# Patient Record
Sex: MALE | Race: WHITE | Employment: UNEMPLOYED | URBAN - METROPOLITAN AREA
[De-identification: names, ages, dates, MRNs, and addresses within clinical notes are randomized per-mention and may not be internally consistent; named-entity substitution may affect disease eponyms.]

---

## 2020-11-16 ENCOUNTER — APPOINTMENT (OUTPATIENT)
Dept: GENERAL RADIOLOGY | Age: 35
End: 2020-11-16

## 2020-11-16 ENCOUNTER — HOSPITAL ENCOUNTER (EMERGENCY)
Age: 35
Discharge: HOME | End: 2020-11-16

## 2020-11-16 PROCEDURE — 6360000002 HC RX W HCPCS: Performed by: PHYSICIAN ASSISTANT

## 2020-11-16 PROCEDURE — 96372 THER/PROPH/DIAG INJ SC/IM: CPT

## 2020-11-16 PROCEDURE — 99282 EMERGENCY DEPT VISIT SF MDM: CPT

## 2020-11-16 PROCEDURE — 73140 X-RAY EXAM OF FINGER(S): CPT

## 2020-11-16 PROCEDURE — 12002 RPR S/N/AX/GEN/TRNK2.6-7.5CM: CPT

## 2020-11-16 RX ORDER — HYDROCODONE BITARTRATE AND ACETAMINOPHEN 5; 325 MG/1; MG/1
1 TABLET ORAL EVERY 6 HOURS PRN
Qty: 10 TABLET | Refills: 0 | Status: SHIPPED | OUTPATIENT
Start: 2020-11-16 | End: 2020-11-19

## 2020-11-16 RX ORDER — CEFAZOLIN SODIUM 1 G/3ML
1 INJECTION, POWDER, FOR SOLUTION INTRAMUSCULAR; INTRAVENOUS ONCE
Status: COMPLETED | OUTPATIENT
Start: 2020-11-16 | End: 2020-11-16

## 2020-11-16 RX ORDER — CEPHALEXIN 500 MG/1
500 CAPSULE ORAL 4 TIMES DAILY
Qty: 40 CAPSULE | Refills: 0 | Status: SHIPPED | OUTPATIENT
Start: 2020-11-16 | End: 2020-11-26

## 2020-11-16 RX ADMIN — CEFAZOLIN SODIUM 1 G: 1 INJECTION, POWDER, FOR SOLUTION INTRAMUSCULAR; INTRAVENOUS at 20:05

## 2020-11-17 ENCOUNTER — OFFICE VISIT (OUTPATIENT)
Dept: ORTHOPEDIC SURGERY | Age: 35
End: 2020-11-17

## 2020-11-17 VITALS — HEIGHT: 74 IN | WEIGHT: 185 LBS | TEMPERATURE: 98.3 F | BODY MASS INDEX: 23.74 KG/M2

## 2020-11-17 PROBLEM — S62.661B: Status: ACTIVE | Noted: 2020-11-17

## 2020-11-17 PROCEDURE — 99203 OFFICE O/P NEW LOW 30 MIN: CPT | Performed by: ORTHOPAEDIC SURGERY

## 2020-11-17 PROCEDURE — 26750 TREAT FINGER FRACTURE EACH: CPT | Performed by: ORTHOPAEDIC SURGERY

## 2020-11-17 NOTE — PROGRESS NOTES
CHIEF COMPLAINT: Left hand pain/ index finger distal phalanx open fracture. DATE OF INJURY: 11/16/2020    HISTORY:  Mr. Shannen Ham 38 y.o.  male right handed presents today for the first visit for evaluation of a left hand injury which occurred when he slipped and cut his finger on a table saw. He is complaining of index finger pain and swelling. Rating 2/10 VAS. This is better with elevation and worse with ROM. The pain is sharp and not radiating. No other complaint. He was seen at [facility] ER, was evaluated, sutured and splinted and asked to see orthopedics. He was started on antibiotics, Keflex but he has not started those. He works as a  and plays a guitar. He denies smoking. Past Medical History:   Diagnosis Date    Dermatitis        Past Surgical History:   Procedure Laterality Date    WISDOM TOOTH EXTRACTION         Social History     Socioeconomic History    Marital status:      Spouse name: Not on file    Number of children: Not on file    Years of education: Not on file    Highest education level: Not on file   Occupational History    Not on file   Social Needs    Financial resource strain: Not on file    Food insecurity     Worry: Not on file     Inability: Not on file    Transportation needs     Medical: Not on file     Non-medical: Not on file   Tobacco Use    Smoking status: Never Smoker    Smokeless tobacco: Never Used   Substance and Sexual Activity    Alcohol use:  Yes    Drug use: Not on file    Sexual activity: Not on file   Lifestyle    Physical activity     Days per week: Not on file     Minutes per session: Not on file    Stress: Not on file   Relationships    Social connections     Talks on phone: Not on file     Gets together: Not on file     Attends [employer] service: Not on file     Active member of club or organization: Not on file     Attends meetings of clubs or organizations: Not on file     Relationship status: Not on file    Intimate partner violence     Fear of current or ex partner: Not on file     Emotionally abused: Not on file     Physically abused: Not on file     Forced sexual activity: Not on file   Other Topics Concern    Not on file   Social History Narrative    Not on file       No family history on file. Current Outpatient Medications on File Prior to Visit   Medication Sig Dispense Refill    cephALEXin (KEFLEX) 500 MG capsule Take 1 capsule by mouth 4 times daily for 10 days 40 capsule 0    HYDROcodone-acetaminophen (NORCO) 5-325 MG per tablet Take 1 tablet by mouth every 6 hours as needed for Pain for up to 3 days. 10 tablet 0     No current facility-administered medications on file prior to visit. Pertinent items are noted in HPI  Review of systems reviewed from Patient History Form dated on 11/17/2020 and available in the patient's chart under the Media tab. No change noted. PHYSICAL EXAMINATION:  Mr. Bladimir Velasquez is a very pleasant 38 y.o.  male who presents today in no acute distress, awake, alert, and oriented. He is well dressed, nourished and  groomed. Patient with normal affect. Height is  6' 2\" (1.88 m), weight is 185 lb (83.9 kg), Body mass index is 23.75 kg/m². Resting respiratory rate is 16. Examination of the gait, showed that the patient walks with no limp . Examination of both upper extremities showing a decreased range of motion of the left index finger compare to the other side. There is moderate swelling that can be seen, as well as ecchymosis of the index finger. There are sutures in for the distal phalanx and the nail. He has intact sensation and good radial pulses. He has significant tenderness on deep palpation over the distal phalanx of the index finger left hand. There is no rotational deformity of the left index finger.                IMAGING: Trisha Taylor were reviewed, dated 11/16/2020 from [facility] ER,  3 views of the left hand, and showed a index finger distal phalanx minimally displaced comminuted fracture. IMPRESSION: Left hand index finger distal phalanx minimally displaced open fracture. PLAN:  I discussed that the overall alignment of this fracture is good and that we can try to treat this non-operatively in a dressing, which was applied today and instructed in care. Warm soaks daily. We discussed the risk of nonunion and or malunion. He was instructed to work on range of motion. We will see him  back in 1 weeks for wound check. We will likely keep the sutures in place for 2 to 3 weeks.       Axel Tavera MD

## 2020-11-17 NOTE — ED PROVIDER NOTES
PAST MEDICAL HISTORY   No past medical history on file. SURGICAL HISTORY     Past Surgical History:   Procedure Laterality Date    FINGER SURGERY Right 12/30/15    incision and drainage righthand with nailbed repair, pining of index finger    FINGER SURGERY Right 1/28/16    MOUTH SURGERY           CURRENTMEDICATIONS       Previous Medications    No medications on file         ALLERGIES     Patient has no known allergies. FAMILYHISTORY       Family History   Problem Relation Age of Onset    Cancer Maternal Grandmother     Heart Disease Maternal Grandmother     Diabetes Maternal Grandfather     Cancer Maternal Grandfather     Heart Disease Maternal Grandfather     Cancer Paternal Grandmother     Cancer Paternal Grandfather     Anesth Problems Neg Hx     Broken Bones Neg Hx     Clotting Disorder Neg Hx     Collagen Disease Neg Hx     Dislocations Neg Hx     Osteoporosis Neg Hx     Rheumatologic Disease Neg Hx     Scoliosis Neg Hx     Severe Sprains Neg Hx           SOCIAL HISTORY       Social History     Tobacco Use    Smoking status: Never Smoker    Smokeless tobacco: Current User     Types: Chew   Substance Use Topics    Alcohol use: Yes     Comment: social     Drug use: No       SCREENINGS             PHYSICAL EXAM    (up to 7 for level 4, 8 or more for level 5)     ED Triage Vitals   BP Temp Temp src Pulse Resp SpO2 Height Weight   -- -- -- -- -- -- -- --       Physical Exam  Vitals signs and nursing note reviewed. Constitutional:       Appearance: He is well-developed. He is not diaphoretic. HENT:      Head: Normocephalic and atraumatic. Right Ear: External ear normal.      Left Ear: External ear normal.      Nose: Nose normal.   Eyes:      General:         Right eye: No discharge. Left eye: No discharge. Neck:      Musculoskeletal: Normal range of motion and neck supple. Cardiovascular:      Pulses: Normal pulses.    Pulmonary:      Effort: Pulmonary effort is normal. No respiratory distress. Musculoskeletal: Normal range of motion. Left hand: He exhibits laceration. Hands:    Skin:     General: Skin is warm and dry. Neurological:      Mental Status: He is alert and oriented to person, place, and time. Sensory: Sensation is intact. Motor: Motor function is intact. Psychiatric:         Behavior: Behavior normal.                 DIAGNOSTIC RESULTS   LABS:    Labs Reviewed - No data to display    All other labs were within normal range or not returned as of this dictation. EKG: All EKG's are interpreted by the Emergency Department Physician in the absence of a cardiologist.  Please see their note for interpretation of EKG. RADIOLOGY:   Non-plain film images such as CT, Ultrasound and MRI are read by the radiologist. Plain radiographic images are visualized and preliminarily interpreted by the ED Provider with the below findings:        Interpretation per the Radiologist below, if available at the time of this note:    XR FINGER RIGHT (MIN 2 VIEWS)    (Results Pending)     No results found. PROCEDURES   Unless otherwise noted below, none     Procedures  Laceration Repair Procedure Note    Indication: Laceration    Procedure: The patient was placed in the appropriate position and anesthesia around the laceration was obtained with a full digital block of the left index finger using 0.5% Bupivacaine without epinephrine. The area was then irrigated with high pressure Shur-Clens and normal saline solution and explored with no foreign bodies discovered and no tendon injury noted. The laceration was closed with 5-0 Prolene using interrupted sutures. There were no additional lacerations requiring repair. The wound area was then dressed with bacitracin, gauze, vaseline soaked gauze and Coban. Total repaired wound length: 3 cm. Other Items: Suture count: 8    The patient tolerated the procedure well.     Complications: MEDICATIONS:  New Prescriptions    No medications on file       DISCONTINUED MEDICATIONS:  Discontinued Medications    No medications on file              (Please note that portions of this note were completed with a voice recognition program.  Efforts were made to edit the dictations but occasionally words are mis-transcribed.)    Tasneem Schwab PA-C (electronically signed)           Roberta Alves PA-C  11/16/20 2028

## 2020-11-17 NOTE — ED NOTES
Patient discharged at this time in no acute distress after verbalizing understanding of discharge instructions and need for follow up with PCP .   Pt left ambulatory to discharge area after reviewing and receiving copy of ov AVS.   Patient education  Learner- Patient and family  Motivation and readiness to learn- Medium to High  Barriers to learning- None  Learning preference/provided instructions- Both written and verbal discharge instructions     Xenia Mcdaniels RN  11/16/20 1971

## 2020-11-24 ENCOUNTER — OFFICE VISIT (OUTPATIENT)
Dept: ORTHOPEDIC SURGERY | Age: 35
End: 2020-11-24

## 2020-11-24 VITALS — BODY MASS INDEX: 23.74 KG/M2 | TEMPERATURE: 96.6 F | HEIGHT: 74 IN | WEIGHT: 185 LBS

## 2020-11-24 PROCEDURE — 99213 OFFICE O/P EST LOW 20 MIN: CPT | Performed by: ORTHOPAEDIC SURGERY

## 2020-11-24 NOTE — PROGRESS NOTES
CHIEF COMPLAINT: Left hand pain/ index finger distal phalanx open fracture. DATE OF INJURY: 11/16/2020    HISTORY:  Mr. Ivory Parko 38 y.o.  male right handed presents today for f/u evaluation of a left hand injury which occurred when he slipped and cut his finger on a table saw. He is complaining of index finger pain and swelling. Rating 3/10 VAS. This is better with elevation and worse with ROM. The pain is sharp and not radiating. No other complaint. He was seen at [facility] ER, was evaluated, sutured and splinted and asked to see orthopedics. He was started on antibiotics, Keflex. He works as a  and plays a guitar. He denies smoking. Past Medical History:   Diagnosis Date    Dermatitis        Past Surgical History:   Procedure Laterality Date    WISDOM TOOTH EXTRACTION         Social History     Socioeconomic History    Marital status:      Spouse name: Not on file    Number of children: Not on file    Years of education: Not on file    Highest education level: Not on file   Occupational History    Not on file   Social Needs    Financial resource strain: Not on file    Food insecurity     Worry: Not on file     Inability: Not on file    Transportation needs     Medical: Not on file     Non-medical: Not on file   Tobacco Use    Smoking status: Never Smoker    Smokeless tobacco: Never Used   Substance and Sexual Activity    Alcohol use:  Yes    Drug use: Not on file    Sexual activity: Not on file   Lifestyle    Physical activity     Days per week: Not on file     Minutes per session: Not on file    Stress: Not on file   Relationships    Social connections     Talks on phone: Not on file     Gets together: Not on file     Attends [employer] service: Not on file     Active member of club or organization: Not on file     Attends meetings of clubs or organizations: Not on file     Relationship status: Not on file    Intimate partner violence     Fear of current or ex partner: Not on file     Emotionally abused: Not on file     Physically abused: Not on file     Forced sexual activity: Not on file   Other Topics Concern    Not on file   Social History Narrative    Not on file       History reviewed. No pertinent family history. Current Outpatient Medications on File Prior to Visit   Medication Sig Dispense Refill    cephALEXin (KEFLEX) 500 MG capsule Take 1 capsule by mouth 4 times daily for 10 days 40 capsule 0     No current facility-administered medications on file prior to visit. Pertinent items are noted in HPI  Review of systems reviewed from Patient History Form dated on 11/17/2020 and available in the patient's chart under the Media tab. No change noted. PHYSICAL EXAMINATION:  Mr. Ketty Denise is a very pleasant 38 y.o.  male who presents today in no acute distress, awake, alert, and oriented. He is well dressed, nourished and  groomed. Patient with normal affect. Height is  6' 2\" (1.88 m), weight is 185 lb (83.9 kg), Body mass index is 23.75 kg/m². Resting respiratory rate is 16. Examination of the gait, showed that the patient walks with no limp . Examination of both upper extremities showing a decreased range of motion of the left index finger compare to the other side. There is moderate swelling that can be seen, as well as ecchymosis of the index finger. There are sutures in for the distal phalanx and the nail. He has intact sensation and good radial pulses. He has significant tenderness on deep palpation over the distal phalanx of the index finger left hand. There is no rotational deformity of the left index finger. IMAGING: Allan Navarro were reviewed, dated 11/16/2020 from [facility] ER,  3 views of the left hand, and showed a index finger distal phalanx minimally displaced comminuted fracture. IMPRESSION: Left hand index finger distal phalanx minimally displaced open fracture.     PLAN:  I discussed that the overall alignment of this fracture is good and that we can try to treat this non-operatively in a dressing, which was applied today and instructed in care, continue PO ABX. Warm soaks daily. We discussed the risk of nonunion and or malunion. He was instructed to work on range of motion.   We will see him  back in 1 weeks for wound check and sutures iremoval.      Petey Grimes MD

## 2020-12-01 ENCOUNTER — OFFICE VISIT (OUTPATIENT)
Dept: ORTHOPEDIC SURGERY | Age: 35
End: 2020-12-01

## 2020-12-01 VITALS — WEIGHT: 185 LBS | BODY MASS INDEX: 23.74 KG/M2 | TEMPERATURE: 97.8 F | HEIGHT: 74 IN

## 2020-12-01 PROCEDURE — 99024 POSTOP FOLLOW-UP VISIT: CPT | Performed by: ORTHOPAEDIC SURGERY

## 2020-12-01 NOTE — PROGRESS NOTES
CHIEF COMPLAINT: Left hand pain/ index finger distal phalanx open fracture. DATE OF INJURY: 11/16/2020    HISTORY:  Mr. Ivory Parko 38 y.o.  male right handed presents today for f/u evaluation of a left hand injury which occurred when he slipped and cut his finger on a table saw. He is complaining of index finger pain and swelling. Rates pain a  0/10 VAS and is much better. The pain is mildly tender and not radiating. No other complaint. He was seen at [facility] ER, was evaluated, sutured and splinted and asked to see orthopedics. He has completed antibiotics, Keflex. He works as a  and plays a guitar. He denies smoking. Past Medical History:   Diagnosis Date    Dermatitis        Past Surgical History:   Procedure Laterality Date    WISDOM TOOTH EXTRACTION         Social History     Socioeconomic History    Marital status:      Spouse name: Not on file    Number of children: Not on file    Years of education: Not on file    Highest education level: Not on file   Occupational History    Not on file   Social Needs    Financial resource strain: Not on file    Food insecurity     Worry: Not on file     Inability: Not on file    Transportation needs     Medical: Not on file     Non-medical: Not on file   Tobacco Use    Smoking status: Never Smoker    Smokeless tobacco: Never Used   Substance and Sexual Activity    Alcohol use:  Yes    Drug use: Not on file    Sexual activity: Not on file   Lifestyle    Physical activity     Days per week: Not on file     Minutes per session: Not on file    Stress: Not on file   Relationships    Social connections     Talks on phone: Not on file     Gets together: Not on file     Attends [employer] service: Not on file     Active member of club or organization: Not on file     Attends meetings of clubs or organizations: Not on file     Relationship status: Not on file    Intimate partner violence     Fear of current or ex partner: Not on file Emotionally abused: Not on file     Physically abused: Not on file     Forced sexual activity: Not on file   Other Topics Concern    Not on file   Social History Narrative    Not on file       History reviewed. No pertinent family history. No current outpatient medications on file prior to visit. No current facility-administered medications on file prior to visit. Pertinent items are noted in HPI  Review of systems reviewed from Patient History Form dated on 11/17/2020 and available in the patient's chart under the Media tab. No change noted. PHYSICAL EXAMINATION:  Mr. Denisse Terry is a very pleasant 38 y.o.  male who presents today in no acute distress, awake, alert, and oriented. He is well dressed, nourished and  groomed. Patient with normal affect. Height is  6' 2\" (1.88 m), weight is 185 lb (83.9 kg), Body mass index is 23.75 kg/m². Resting respiratory rate is 16. Examination of the gait, showed that the patient walks with no limp . Examination of both upper extremities showing a decreased range of motion of the left index finger compare to the other side in the DIP joint. There is mild swelling that can be seen, as well as ecchymosis of the index finger. There are sutures in for the distal phalanx and the nail. He has intact sensation and good radial pulses. He has mild to no tenderness on deep palpation over the distal phalanx of the index finger left hand. There is no rotational deformity of the left index finger. IMAGING: Elaminaia Plana were reviewed, dated 11/16/2020 from [facility] ER,  3 views of the left hand, and showed a index finger distal phalanx minimally displaced comminuted fracture. IMPRESSION: Left hand index finger distal phalanx minimally displaced open fracture. PLAN:  I discussed that the overall alignment of this fracture is good and that we can try to treat this non-operatively in a dressing, which was applied today.   Sutures removed today and tolerated well. We discussed the risk of nonunion and or malunion. He was instructed to work on range of motion. We will see him  back in 2 weeks for wound check and will repeat x-rays at that time.       Charis Rose MD

## 2021-01-05 ENCOUNTER — OFFICE VISIT (OUTPATIENT)
Dept: ORTHOPEDIC SURGERY | Age: 36
End: 2021-01-05

## 2021-01-05 VITALS — BODY MASS INDEX: 23.75 KG/M2 | WEIGHT: 185 LBS | TEMPERATURE: 96.6 F

## 2021-01-05 DIAGNOSIS — S62.661B NONDISPLACED FRACTURE OF DISTAL PHALANX OF LEFT INDEX FINGER, INITIAL ENCOUNTER FOR OPEN FRACTURE: Primary | ICD-10-CM

## 2021-01-05 PROCEDURE — 99024 POSTOP FOLLOW-UP VISIT: CPT | Performed by: ORTHOPAEDIC SURGERY

## 2021-01-05 NOTE — PROGRESS NOTES
CHIEF COMPLAINT: Left hand pain/ index finger distal phalanx open fracture. DATE OF INJURY: 11/16/2020    HISTORY:  Mr. Danay Orr 38 y.o.  male right handed presents today for f/u evaluation of a left hand injury which occurred when he slipped and cut his finger on a table saw. He is complaining of index finger pain and swelling. Rates pain a  0/10 VAS and is much better. The pain is mildly tender and not radiating. No other complaint. He was seen at [facility] ER, was evaluated, sutured and splinted and asked to see orthopedics. He has completed antibiotics, Keflex. He works as a  and plays a guitar. He denies smoking. Past Medical History:   Diagnosis Date    Dermatitis        Past Surgical History:   Procedure Laterality Date    WISDOM TOOTH EXTRACTION         Social History     Socioeconomic History    Marital status:      Spouse name: Not on file    Number of children: Not on file    Years of education: Not on file    Highest education level: Not on file   Occupational History    Not on file   Social Needs    Financial resource strain: Not on file    Food insecurity     Worry: Not on file     Inability: Not on file    Transportation needs     Medical: Not on file     Non-medical: Not on file   Tobacco Use    Smoking status: Never Smoker    Smokeless tobacco: Never Used   Substance and Sexual Activity    Alcohol use:  Yes    Drug use: Not on file    Sexual activity: Not on file   Lifestyle    Physical activity     Days per week: Not on file     Minutes per session: Not on file    Stress: Not on file   Relationships    Social connections     Talks on phone: Not on file     Gets together: Not on file     Attends [employer] service: Not on file     Active member of club or organization: Not on file     Attends meetings of clubs or organizations: Not on file     Relationship status: Not on file    Intimate partner violence     Fear of current or ex partner: Not on file Emotionally abused: Not on file     Physically abused: Not on file     Forced sexual activity: Not on file   Other Topics Concern    Not on file   Social History Narrative    Not on file       History reviewed. No pertinent family history. No current outpatient medications on file prior to visit. No current facility-administered medications on file prior to visit. Pertinent items are noted in HPI  Review of systems reviewed from Patient History Form dated on 11/17/2020 and available in the patient's chart under the Media tab. No change noted. PHYSICAL EXAMINATION:  Mr. Susy Jaramillo is a very pleasant 38 y.o.  male who presents today in no acute distress, awake, alert, and oriented. He is well dressed, nourished and  groomed. Patient with normal affect. Height is   , weight is 185 lb (83.9 kg), Body mass index is 23.75 kg/m². Resting respiratory rate is 16. Examination of the gait, showed that the patient walks with no limp . Examination of both upper extremities showing a decreased range of motion of the left index finger compare to the other side in the DIP joint. There is mild swelling that can be seen, as well as ecchymosis of the index finger. There are sutures in for the distal phalanx and the nail. He has intact sensation and good radial pulses. He has mild to no tenderness on deep palpation over the distal phalanx of the index finger left hand. There is no rotational deformity of the left index finger, but there is a nail deformity. IMAGING: Esther Daryl were reviewed, taken today in the office, 3 views of the left hand, and showed a index finger distal phalanx minimally displaced comminuted fracture. IMPRESSION: Left hand index finger distal phalanx minimally displaced open fracture. PLAN:  I discussed that the overall alignment of this fracture is good and that we can try to treat this non-operatively in a dressing, which was applied today.   Sutures removed today and tolerated well. We discussed the risk of nonunion and or malunion. He was instructed to work on range of motion. We will see him  back in 6 weeks for wound check and will repeat x-rays at that time.       Geo Gray MD

## 2021-02-16 ENCOUNTER — OFFICE VISIT (OUTPATIENT)
Dept: ORTHOPEDIC SURGERY | Age: 36
End: 2021-02-16

## 2021-02-16 VITALS
HEIGHT: 74 IN | TEMPERATURE: 96.8 F | DIASTOLIC BLOOD PRESSURE: 74 MMHG | SYSTOLIC BLOOD PRESSURE: 115 MMHG | HEART RATE: 59 BPM | BODY MASS INDEX: 23.74 KG/M2 | WEIGHT: 185 LBS

## 2021-02-16 DIAGNOSIS — S62.661B NONDISPLACED FRACTURE OF DISTAL PHALANX OF LEFT INDEX FINGER, INITIAL ENCOUNTER FOR OPEN FRACTURE: Primary | ICD-10-CM

## 2021-02-16 PROCEDURE — 99213 OFFICE O/P EST LOW 20 MIN: CPT | Performed by: NURSE PRACTITIONER

## 2021-02-16 NOTE — PROGRESS NOTES
file     Relationship status: Not on file    Intimate partner violence     Fear of current or ex partner: Not on file     Emotionally abused: Not on file     Physically abused: Not on file     Forced sexual activity: Not on file   Other Topics Concern    Not on file   Social History Narrative    Not on file     No current outpatient medications on file. No current facility-administered medications for this visit. Pertinent items are noted in HPI  Review of systems reviewed from Patient History Form dated on 11/16/2020 and available in the patient's chart under the Media tab. No change noted. PHYSICAL EXAMINATION:  Mr. Malik Reich is a very pleasant 39 y.o.  male who presents today in no acute distress, awake, alert, and oriented. He is well dressed, nourished and  groomed. Patient with normal affect. Height is  6' 2\" (1.88 m), weight is 185 lb (83.9 kg), Body mass index is 23.75 kg/m². Resting respiratory rate is 16. Examination of the gait, showed that the patient walks with no limp . Examination of both upper extremities showing a full range of motion of the left index finger compare to the other side. There is mild to no swelling that can be seen, no ecchymosis of the index finger. .He has intact sensation and good radial pulses. He has no tenderness on deep palpation over the distal phalanx of the index finger left hand. There is no rotational deformity of the left index finger, but there is a nail deformity. The new nail has a deformity, but is growing in. Good strength, and no instability both upper and lower extremities. IMAGING: Ana Laura Montez were reviewed, taken today in the office, 3 views of the left hand, and showed a index finger distal phalanx minimally displaced comminuted fracture. IMPRESSION: Left hand index finger distal phalanx minimally displaced open fracture. PLAN:  I discussed that the overall alignment of this fracture is good.   He was instructed to work on range of motion. He can go back to normal activity with no restrictions. He will be seen in 3 months  PRN. I told the patient that it is not unusual to have some achy pain and swelling for up to a year after a fracture.       Noemy Moscoso, APRN - CNP

## 2023-03-13 ENCOUNTER — HOSPITAL ENCOUNTER (EMERGENCY)
Age: 38
Discharge: HOME | End: 2023-03-13
Attending: EMERGENCY MEDICINE

## 2023-03-13 ENCOUNTER — APPOINTMENT (OUTPATIENT)
Dept: CT IMAGING | Age: 38
End: 2023-03-13

## 2023-03-13 VITALS
TEMPERATURE: 97.7 F | RESPIRATION RATE: 18 BRPM | WEIGHT: 175 LBS | HEIGHT: 74 IN | BODY MASS INDEX: 22.46 KG/M2 | SYSTOLIC BLOOD PRESSURE: 125 MMHG | OXYGEN SATURATION: 98 % | HEART RATE: 69 BPM | DIASTOLIC BLOOD PRESSURE: 71 MMHG

## 2023-03-13 DIAGNOSIS — N20.0 KIDNEY STONE: Primary | ICD-10-CM

## 2023-03-13 DIAGNOSIS — R10.9 RIGHT FLANK PAIN: ICD-10-CM

## 2023-03-13 LAB
A/G RATIO: 1.7 (ref 1.1–2.2)
ALBUMIN SERPL-MCNC: 4.6 G/DL (ref 3.4–5)
ALP BLD-CCNC: 96 U/L (ref 40–129)
ALT SERPL-CCNC: 7 U/L (ref 10–40)
AMPHETAMINE SCREEN, URINE: NORMAL
ANION GAP SERPL CALCULATED.3IONS-SCNC: 15 MMOL/L (ref 3–16)
AST SERPL-CCNC: 19 U/L (ref 15–37)
BACTERIA: ABNORMAL /HPF
BARBITURATE SCREEN URINE: NORMAL
BASOPHILS ABSOLUTE: 0 K/UL (ref 0–0.2)
BASOPHILS RELATIVE PERCENT: 0.6 %
BENZODIAZEPINE SCREEN, URINE: NORMAL
BILIRUB SERPL-MCNC: 0.4 MG/DL (ref 0–1)
BILIRUBIN URINE: NEGATIVE
BLOOD, URINE: ABNORMAL
BUN BLDV-MCNC: 18 MG/DL (ref 7–20)
CALCIUM SERPL-MCNC: 9.7 MG/DL (ref 8.3–10.6)
CANNABINOID SCREEN URINE: NORMAL
CHLORIDE BLD-SCNC: 100 MMOL/L (ref 99–110)
CLARITY: CLEAR
CO2: 26 MMOL/L (ref 21–32)
COCAINE METABOLITE SCREEN URINE: NORMAL
COLOR: YELLOW
CREAT SERPL-MCNC: 1.1 MG/DL (ref 0.9–1.3)
EOSINOPHILS ABSOLUTE: 0.3 K/UL (ref 0–0.6)
EOSINOPHILS RELATIVE PERCENT: 4.7 %
EPITHELIAL CELLS, UA: 0 /HPF (ref 0–5)
ETHANOL: NORMAL MG/DL (ref 0–0.08)
FENTANYL SCREEN, URINE: NORMAL
GFR SERPL CREATININE-BSD FRML MDRD: >60 ML/MIN/{1.73_M2}
GLUCOSE BLD-MCNC: 149 MG/DL (ref 70–99)
GLUCOSE URINE: NEGATIVE MG/DL
HCT VFR BLD CALC: 45.8 % (ref 40.5–52.5)
HEMOGLOBIN: 14.9 G/DL (ref 13.5–17.5)
HYALINE CASTS: 0 /LPF (ref 0–8)
KETONES, URINE: 40 MG/DL
LACTIC ACID, SEPSIS: 2.1 MMOL/L (ref 0.4–1.9)
LACTIC ACID, SEPSIS: 5.7 MMOL/L (ref 0.4–1.9)
LEUKOCYTE ESTERASE, URINE: NEGATIVE
LIPASE: 42 U/L (ref 13–60)
LYMPHOCYTES ABSOLUTE: 1.9 K/UL (ref 1–5.1)
LYMPHOCYTES RELATIVE PERCENT: 30 %
Lab: NORMAL
MCH RBC QN AUTO: 28.5 PG (ref 26–34)
MCHC RBC AUTO-ENTMCNC: 32.4 G/DL (ref 31–36)
MCV RBC AUTO: 87.9 FL (ref 80–100)
METHADONE SCREEN, URINE: NORMAL
MICROSCOPIC EXAMINATION: YES
MONOCYTES ABSOLUTE: 0.3 K/UL (ref 0–1.3)
MONOCYTES RELATIVE PERCENT: 4.5 %
NEUTROPHILS ABSOLUTE: 3.9 K/UL (ref 1.7–7.7)
NEUTROPHILS RELATIVE PERCENT: 60.2 %
NITRITE, URINE: NEGATIVE
OPIATE SCREEN URINE: NORMAL
OXYCODONE URINE: NORMAL
PDW BLD-RTO: 13.7 % (ref 12.4–15.4)
PH UA: 7
PH UA: 7 (ref 5–8)
PHENCYCLIDINE SCREEN URINE: NORMAL
PLATELET # BLD: 285 K/UL (ref 135–450)
PMV BLD AUTO: 7.6 FL (ref 5–10.5)
POTASSIUM REFLEX MAGNESIUM: 3.8 MMOL/L (ref 3.5–5.1)
PROTEIN UA: 30 MG/DL
RBC # BLD: 5.22 M/UL (ref 4.2–5.9)
RBC UA: 25 /HPF (ref 0–4)
SODIUM BLD-SCNC: 141 MMOL/L (ref 136–145)
SPECIFIC GRAVITY UA: 1.02 (ref 1–1.03)
TOTAL PROTEIN: 7.3 G/DL (ref 6.4–8.2)
URINE REFLEX TO CULTURE: ABNORMAL
URINE TYPE: ABNORMAL
UROBILINOGEN, URINE: 1 E.U./DL
WBC # BLD: 6.5 K/UL (ref 4–11)
WBC UA: 0 /HPF (ref 0–5)

## 2023-03-13 PROCEDURE — 80307 DRUG TEST PRSMV CHEM ANLYZR: CPT

## 2023-03-13 PROCEDURE — 81001 URINALYSIS AUTO W/SCOPE: CPT

## 2023-03-13 PROCEDURE — 82077 ASSAY SPEC XCP UR&BREATH IA: CPT

## 2023-03-13 PROCEDURE — 6370000000 HC RX 637 (ALT 250 FOR IP): Performed by: PHYSICIAN ASSISTANT

## 2023-03-13 PROCEDURE — 36415 COLL VENOUS BLD VENIPUNCTURE: CPT

## 2023-03-13 PROCEDURE — 85025 COMPLETE CBC W/AUTO DIFF WBC: CPT

## 2023-03-13 PROCEDURE — 6360000002 HC RX W HCPCS: Performed by: EMERGENCY MEDICINE

## 2023-03-13 PROCEDURE — 74176 CT ABD & PELVIS W/O CONTRAST: CPT

## 2023-03-13 PROCEDURE — 96372 THER/PROPH/DIAG INJ SC/IM: CPT

## 2023-03-13 PROCEDURE — 96375 TX/PRO/DX INJ NEW DRUG ADDON: CPT

## 2023-03-13 PROCEDURE — 80053 COMPREHEN METABOLIC PANEL: CPT

## 2023-03-13 PROCEDURE — 6360000002 HC RX W HCPCS: Performed by: PHYSICIAN ASSISTANT

## 2023-03-13 PROCEDURE — 2580000003 HC RX 258: Performed by: EMERGENCY MEDICINE

## 2023-03-13 PROCEDURE — 83605 ASSAY OF LACTIC ACID: CPT

## 2023-03-13 PROCEDURE — 96374 THER/PROPH/DIAG INJ IV PUSH: CPT

## 2023-03-13 PROCEDURE — 83690 ASSAY OF LIPASE: CPT

## 2023-03-13 PROCEDURE — 99284 EMERGENCY DEPT VISIT MOD MDM: CPT

## 2023-03-13 RX ORDER — KETOROLAC TROMETHAMINE 15 MG/ML
30 INJECTION, SOLUTION INTRAMUSCULAR; INTRAVENOUS ONCE
Status: COMPLETED | OUTPATIENT
Start: 2023-03-13 | End: 2023-03-13

## 2023-03-13 RX ORDER — PROMETHAZINE HYDROCHLORIDE 25 MG/ML
25 INJECTION, SOLUTION INTRAMUSCULAR; INTRAVENOUS ONCE
Status: COMPLETED | OUTPATIENT
Start: 2023-03-13 | End: 2023-03-13

## 2023-03-13 RX ORDER — TAMSULOSIN HYDROCHLORIDE 0.4 MG/1
0.4 CAPSULE ORAL DAILY
Qty: 5 CAPSULE | Refills: 0 | Status: SHIPPED | OUTPATIENT
Start: 2023-03-13 | End: 2023-03-18

## 2023-03-13 RX ORDER — ONDANSETRON 2 MG/ML
4 INJECTION INTRAMUSCULAR; INTRAVENOUS
Status: COMPLETED | OUTPATIENT
Start: 2023-03-13 | End: 2023-03-13

## 2023-03-13 RX ORDER — ONDANSETRON 4 MG/1
4 TABLET, FILM COATED ORAL EVERY 8 HOURS PRN
Qty: 20 TABLET | Refills: 0 | Status: SHIPPED | OUTPATIENT
Start: 2023-03-13

## 2023-03-13 RX ORDER — 0.9 % SODIUM CHLORIDE 0.9 %
1500 INTRAVENOUS SOLUTION INTRAVENOUS ONCE
Status: COMPLETED | OUTPATIENT
Start: 2023-03-13 | End: 2023-03-13

## 2023-03-13 RX ORDER — OXYCODONE HYDROCHLORIDE AND ACETAMINOPHEN 5; 325 MG/1; MG/1
1 TABLET ORAL EVERY 6 HOURS PRN
Qty: 12 TABLET | Refills: 0 | Status: SHIPPED | OUTPATIENT
Start: 2023-03-13 | End: 2023-03-16

## 2023-03-13 RX ORDER — 0.9 % SODIUM CHLORIDE 0.9 %
1000 INTRAVENOUS SOLUTION INTRAVENOUS ONCE
Status: COMPLETED | OUTPATIENT
Start: 2023-03-13 | End: 2023-03-13

## 2023-03-13 RX ORDER — HYDROMORPHONE HYDROCHLORIDE 1 MG/ML
1 INJECTION, SOLUTION INTRAMUSCULAR; INTRAVENOUS; SUBCUTANEOUS
Status: DISCONTINUED | OUTPATIENT
Start: 2023-03-13 | End: 2023-03-14 | Stop reason: HOSPADM

## 2023-03-13 RX ORDER — TAMSULOSIN HYDROCHLORIDE 0.4 MG/1
0.4 CAPSULE ORAL ONCE
Status: COMPLETED | OUTPATIENT
Start: 2023-03-13 | End: 2023-03-13

## 2023-03-13 RX ADMIN — PROMETHAZINE HYDROCHLORIDE 25 MG: 25 INJECTION INTRAMUSCULAR; INTRAVENOUS at 19:39

## 2023-03-13 RX ADMIN — ONDANSETRON 4 MG: 2 INJECTION INTRAMUSCULAR; INTRAVENOUS at 21:45

## 2023-03-13 RX ADMIN — ONDANSETRON 4 MG: 2 INJECTION INTRAMUSCULAR; INTRAVENOUS at 17:51

## 2023-03-13 RX ADMIN — KETOROLAC TROMETHAMINE 30 MG: 15 INJECTION, SOLUTION INTRAMUSCULAR; INTRAVENOUS at 19:38

## 2023-03-13 RX ADMIN — SODIUM CHLORIDE 1000 ML: 9 INJECTION, SOLUTION INTRAVENOUS at 18:06

## 2023-03-13 RX ADMIN — SODIUM CHLORIDE 1500 ML: 9 INJECTION, SOLUTION INTRAVENOUS at 20:24

## 2023-03-13 RX ADMIN — TAMSULOSIN HYDROCHLORIDE 0.4 MG: 0.4 CAPSULE ORAL at 19:58

## 2023-03-13 ASSESSMENT — PAIN SCALES - GENERAL
PAINLEVEL_OUTOF10: 10
PAINLEVEL_OUTOF10: 5
PAINLEVEL_OUTOF10: 10

## 2023-03-13 ASSESSMENT — ENCOUNTER SYMPTOMS
RHINORRHEA: 0
DIARRHEA: 0
SHORTNESS OF BREATH: 0
NAUSEA: 1
VOMITING: 1
COUGH: 0
ABDOMINAL PAIN: 1

## 2023-03-13 ASSESSMENT — PAIN - FUNCTIONAL ASSESSMENT: PAIN_FUNCTIONAL_ASSESSMENT: 0-10

## 2023-03-13 ASSESSMENT — LIFESTYLE VARIABLES
HOW OFTEN DO YOU HAVE A DRINK CONTAINING ALCOHOL: MONTHLY OR LESS
HOW MANY STANDARD DRINKS CONTAINING ALCOHOL DO YOU HAVE ON A TYPICAL DAY: 1 OR 2

## 2023-03-13 ASSESSMENT — PAIN DESCRIPTION - LOCATION: LOCATION: ABDOMEN

## 2023-03-14 NOTE — ED PROVIDER NOTES
I independently saw performed a substantive portion of the visit (history, physical, and MDM) on Terri Franks. All diagnostic, treatment, and disposition decisions were made by myself in conjunction with the advanced practice provider. I have participated in the medical decision making and directed the treatment plan and disposition of the patient. For further details of [facility] emergency department encounter, please see the advanced practice provider's documentation. Andrew Steven MD, am the primary physician provider of record. CHIEF COMPLAINT  Chief Complaint   Patient presents with    Abdominal Pain     Pt states lower abd pain, thought gas, pt hyperventilating, pt states tingling arms       Briefly, Terri Franks is a 43 y.o. male  who presents to the ED complaining of acute onset of right-sided abdominal pain in the lower abdomen rating to the flank and back. He is actively retching and vomiting stating that \"something is exploding inside of me\" on initial assessment. He has never had symptoms like this before. Denies testicular pain. Not urinated since symptom onset so denies any dysuria or hematuria recently. No diarrhea. No history of kidney stones or prior abdominal surgeries. FOCUSED PHYSICAL EXAMINATION  /63   Pulse 69   Temp 97.7 °F (36.5 °C) (Oral)   Resp 18   Ht 6' 2\" (1.88 m)   Wt 175 lb (79.4 kg)   SpO2 98%   BMI 22.47 kg/m²    Focused physical examination notable for moderate acute distress, well-appearing, well-nourished, normal speech and mentation without obvious facial droop, no obvious rash. No obvious cranial nerve deficits on my initial exam.  Writhing around actively distressed. Nontoxic-appearing. Moderate right lower quadrant right flank and right CVA tenderness, no other abdominal tenderness or left CVA tenderness. Regular rate and rhythm clear to auscultation bilaterally. Actively retching and vomiting.       EKG performed in ED:  None        ED COURSE/MDM  Patient seen and evaluated. Old records reviewed. Labs and imaging reviewed. After initial evaluation, differential diagnostic considerations included but not limited to: kidney stone, pyelonephritis, UTI, appendicitis, bowel obstruction, diverticulitis, hernia, gastritis/gastroenteritis, pancreatitis, cholecystitis, hepatitis, constipation, IBS, IBD    The patient's ED workup was notable for acute onset of right-sided flank pain consistent with renal colic. Urinalysis has no white blood cells nitrite positivity or leukocyte esterase but does have red blood cells consistent with renal colic/kidney stone. Initial lactate was notably elevated at 5.7 but the patient was actively vomiting and hyperventilating and writhing around at the time it was drawn. He does not otherwise profile as septic, with no leukocytosis fever hypotension tachycardia or other acute abnormalities. The patient has a 2 mm obstructing kidney stone on the right side but due to its small size has a high likelihood of passing spontaneously without need for surgical intervention. He was given a urine strainer and provided prescriptions of Flomax and urology follow-up. He did receive IV Dilaudid IV Zofran IV fluids and IV Toradol here as well as IM Phenergan. On reassessment he is feeling a lot better compared to his initial assessment and tolerating oral intake. His lactate is improving notably on recheck. He does not have an HECTOR. I do feel that outpatient management is appropriate at this time given his symptom improvement and findings from today's work-up. Is this patient to be included in the SEP-1 Core Measure? No   Exclusion criteria - the patient is NOT to be included for SEP-1 Core Measure due to:   Infection is not suspected      Consults:  None    History obtained from: Patient    Pertinent social determinants of health: None applicable    Chronic conditions potentially affecting care: None applicable    Review of other records:  None    Reassessment:  See [facility] for details of medications given and reassessment    During the patient's ED course, the patient was given:  Medications   HYDROmorphone HCl PF (DILAUDID) injection 1 mg (has no administration in time range)   ondansetron (ZOFRAN) injection 4 mg (4 mg IntraVENous Given 3/13/23 2145)   0.9 % sodium chloride IV bolus 1,000 mL (0 mLs IntraVENous Stopped 3/13/23 2024)   0.9 % sodium chloride IV bolus 1,500 mL (0 mLs IntraVENous Stopped 3/13/23 2025)   ketorolac (TORADOL) injection 30 mg (30 mg IntraVENous Given 3/13/23 1938)   promethazine (PHENERGAN) injection 25 mg (25 mg IntraMUSCular Given 3/13/23 1939)   tamsulosin (FLOMAX) capsule 0.4 mg (0.4 mg Oral Given 3/13/23 1958)        CLINICAL IMPRESSION  1. Kidney stone    2. Right flank pain        Blood pressure 113/63, pulse 69, temperature 97.7 °F (36.5 °C), temperature source Oral, resp. rate 18, height 6' 2\" (1.88 m), weight 175 lb (79.4 kg), SpO2 98 %. DISPOSITION  Abram Macias was discharged in stable condition. I have discussed the findings of today's workup with the patient and addressed the patient's questions and concerns. Important warning signs as well as new or worsening symptoms which would necessitate immediate return to the ED were discussed. The plan is to discharge from the ED at this time, and the patient is in stable condition. The patient acknowledged understanding is agreeable with this plan. Patient was given scripts for the following medications. I counseled patient how to take these medications. New Prescriptions    ONDANSETRON (ZOFRAN) 4 MG TABLET    Take 1 tablet by mouth every 8 hours as needed for Nausea    OXYCODONE-ACETAMINOPHEN (PERCOCET) 5-325 MG PER TABLET    Take 1 tablet by mouth every 6 hours as needed for Pain for up to 3 days. Intended supply: 3 days.  Take lowest dose possible to manage pain Max Daily Amount: 4 tablets    TAMSULOSIN (FLOMAX) 0.4 MG CAPSULE    Take 1 capsule by mouth daily for 5 doses       Follow-up with:  Roberto Simpson MD  [facility]  [phone]    Schedule an appointment as soon as possible for a visit in 2 days      [facility] Emergency Department  [address] [place] [address]  [phone]    As needed, If symptoms worsen    Critical care time:  None    DISCLAIMER: This chart was created using Dragon dictation software. Efforts were made by me to ensure accuracy, however some errors may be present due to limitations of this technology and occasionally words are not transcribed correctly.         Craig Gosselin, MD  03/13/23 4660

## 2023-03-14 NOTE — ED PROVIDER NOTES
[facility]        Pt Name: Virgen Ambrocio  MRN: [id number]  [place] 1985  Date of evaluation: 3/13/2023  Provider: Gracy Soriano PA-C  PCP: Billy Love MD  Note Started: 10:25 PM EDT 3/13/23       I have seen and evaluated this patient with my supervising physician Manuel Mtz MD.      [address] [place] [address]       Chief Complaint   Patient presents with    Abdominal Pain     Pt states lower abd pain, thought gas, pt hyperventilating, pt states tingling arms       HISTORY OF PRESENT ILLNESS: 1 or more Elements     History From: Patient, wife at bedside  Limitations to history : None    Virgen Ambrocio is a 43 y.o. male who presents to the emergency department today for evaluation for right lower quadrant abdominal pain. The patient states that he started with a sudden onset of pain to his right lower abdomen earlier today, associated nausea, and vomiting. The patient states that he thought that something was \"exploding in my insides\". The patient states that he has never had any pain like this in the past.  The patient states that his pain is sharp, constant is currently rated as a 10/10. He denies any known alleviating or admitting factors. The patient initially was hyperventilating, and writhing around in pain. Patient is nauseous and has had multiple episodes of emesis, he denies any bilious emesis, hematemesis or coffee-ground emesis. Patient has no chest pain. No shortness of breath. He denies any dysuria hematuria but states he is having some difficulty with urination. He is no positive HISTORY to the abdomen. No history of kidney stones. Nursing Notes were all reviewed and agreed with or any disagreements were addressed in the HPI. REVIEW OF SYSTEMS :      Review of Systems   Constitutional:  Negative for activity change, appetite change, chills and fever. HENT:  Negative for congestion and rhinorrhea. Respiratory:  Negative for cough and shortness of breath. Cardiovascular:  Negative for chest pain. Gastrointestinal:  Positive for abdominal pain, nausea and vomiting. Negative for diarrhea. Genitourinary:  Positive for flank pain. Negative for difficulty urinating, dysuria and hematuria. Positives and Pertinent negatives as per HPI. SURGICAL HISTORY     Past Surgical History:   Procedure Laterality Date    WISDOM TOOTH EXTRACTION         CURRENTMEDICATIONS       Previous Medications    No medications on file       ALLERGIES     Patient has no known allergies. FAMILYHISTORY     History reviewed. No pertinent family history. SOCIAL HISTORY       Social History     Tobacco Use    Smoking status: Never    Smokeless tobacco: Never   Vaping Use    Vaping Use: Never used   Substance Use Topics    Alcohol use: Yes       SCREENINGS        Akira Coma Scale  Eye Opening: Spontaneous  Best Verbal Response: Oriented  Best Motor Response: Obeys commands  [place] Coma Scale Score: 15                CIWA Assessment  BP: 125/71  Heart Rate: 69           PHYSICAL EXAM  1 or more Elements     ED Triage Vitals [03/13/23 1724]   BP Temp Temp Source Heart Rate Resp SpO2 Height Weight   (!) 157/57 97.7 °F (36.5 °C) Oral 69 18 98 % 6' 2\" (1.88 m) 175 lb (79.4 kg)       Physical Exam  Vitals and nursing note reviewed. Constitutional:       Appearance: He is well-developed. He is not diaphoretic. Comments: Appears to be uncomfortable, though no acute distress. Writhing around the bed. HENT:      Head: Normocephalic and atraumatic. Right Ear: External ear normal.      Left Ear: External ear normal.      Nose: Nose normal.   Eyes:      General:         Right eye: No discharge. Left eye: No discharge. Neck:      Trachea: No tracheal deviation. Cardiovascular:      Rate and Rhythm: Normal rate and regular rhythm. Heart sounds: No murmur heard.   Pulmonary:      Effort: Pulmonary effort is normal. No respiratory distress. Breath sounds: Normal breath sounds. No wheezing or rales. Abdominal:      General: Bowel sounds are normal. There is no distension. Palpations: Abdomen is soft. Tenderness: There is abdominal tenderness. There is no guarding or rebound. Musculoskeletal:         General: Normal range of motion. Cervical back: Normal range of motion and neck supple. Skin:     General: Skin is warm and dry. Neurological:      Mental Status: He is alert and oriented to person, place, and time. Psychiatric:         Behavior: Behavior normal.           DIAGNOSTIC RESULTS   LABS:    Labs Reviewed   COMPREHENSIVE METABOLIC PANEL W/ REFLEX TO MG FOR LOW K - Abnormal; Notable for the following components:       Result Value    Glucose 149 (*)     ALT 7 (*)     All other components within normal limits   LACTATE, SEPSIS - Abnormal; Notable for the following components:    Lactic Acid, Sepsis 5.7 (*)     All other components within normal limits    Narrative:     Niki Koo  [facility] [phone],  Chemistry results called to and read back by Soy Nichols, 03/13/2023 18:10,  by [address] [place], SEPSIS - Abnormal; Notable for the following components:    Lactic Acid, Sepsis 2.1 (*)     All other components within normal limits   URINALYSIS WITH REFLEX TO CULTURE - Abnormal; Notable for the following components:    Ketones, Urine 40 (*)     Blood, Urine SMALL (*)     Protein, UA 30 (*)     All other components within normal limits   MICROSCOPIC URINALYSIS - Abnormal; Notable for the following components:    RBC, UA 25 (*)     All other components within normal limits   CBC WITH AUTO DIFFERENTIAL   LIPASE   ETHANOL   URINE DRUG SCREEN       When ordered only abnormal lab results are displayed. All other labs were within normal range or not returned as of this dictation. EKG:  When ordered, EKG's are interpreted by the Emergency Department Physician in the absence of a cardiologist.  Please see their note for interpretation of EKG. RADIOLOGY:   Non-plain film images such as CT, Ultrasound and MRI are read by the radiologist. Plain radiographic images are visualized and preliminarily interpreted by the ED Provider with the below findings:        Interpretation per the Radiologist below, if available at the time of this note:    CT ABDOMEN PELVIS WO CONTRAST Additional Contrast? None   Final Result   1. 2 mm obstructing nephrolithiasis within the right distal ureter causing   mild hydroureteronephrosis. CT ABDOMEN PELVIS WO CONTRAST Additional Contrast? None    Result Date: 3/13/2023  EXAMINATION: CT OF THE ABDOMEN AND PELVIS WITHOUT CONTRAST 3/13/2023 6:20 pm TECHNIQUE: CT of the abdomen and pelvis was performed without the administration of intravenous contrast. Multiplanar reformatted images are provided for review. Automated exposure control, iterative reconstruction, and/or weight based adjustment of the mA/kV was utilized to reduce the radiation dose to as low as reasonably achievable. COMPARISON: None. HISTORY: ORDERING SYSTEM PROVIDED HISTORY: abd pain vomiting TECHNOLOGIST PROVIDED HISTORY: Reason for exam:->abd pain vomiting Additional Contrast?->None Decision Support Exception - unselect if not a suspected or confirmed emergency medical condition->Emergency Medical Condition (MA) Reason for Exam: Abd pain, vomiting, Abdominal Pain (Pt states lower abd pain, thought gas, pt hyperventilating, pt states tingling arms). FINDINGS: Lower Chest: The lung bases are clear. Organs: The unenhanced liver, spleen, pancreas, adrenal glands and left kidney are unremarkable. Multiple low attenuating lesions throughout the liver favoring benign cysts. A punctate nonobstructing nephrolithiasis is present in the right midpole. However, there is a 2 mm obstructing nephrolithiasis within the right distal ureter causing mild hydroureteronephrosis. GI/Bowel:  There is no bowel obstruction. The appendix is within normal limits. Pelvis: The unenhanced pelvic viscera are within normal limits. Peritoneum/Retroperitoneum: There is no evidence of free fluid or adenopathy. Bones/Soft Tissues: Degenerative changes involve the bilateral hips. 1. 2 mm obstructing nephrolithiasis within the right distal ureter causing mild hydroureteronephrosis. No results found. PROCEDURES   Unless otherwise noted below, none     Procedures    CRITICAL CARE TIME (.cctime)       PAST MEDICAL HISTORY      has a past medical history of Dermatitis. EMERGENCY DEPARTMENT COURSE and DIFFERENTIAL DIAGNOSIS/MDM:   Vitals:    Vitals:    03/13/23 2115 03/13/23 2130 03/13/23 2145 03/13/23 2200   BP: 125/76 113/63 125/72 125/71   Pulse:       Resp:       Temp:       TempSrc:       SpO2:       Weight:       Height:           Patient was given the following medications:  Medications   HYDROmorphone HCl PF (DILAUDID) injection 1 mg (has no administration in time range)   ondansetron (ZOFRAN) injection 4 mg (4 mg IntraVENous Given 3/13/23 2145)   0.9 % sodium chloride IV bolus 1,000 mL (0 mLs IntraVENous Stopped 3/13/23 2024)   0.9 % sodium chloride IV bolus 1,500 mL (0 mLs IntraVENous Stopped 3/13/23 2025)   ketorolac (TORADOL) injection 30 mg (30 mg IntraVENous Given 3/13/23 1938)   promethazine (PHENERGAN) injection 25 mg (25 mg IntraMUSCular Given 3/13/23 1939)   tamsulosin (FLOMAX) capsule 0.4 mg (0.4 mg Oral Given 3/13/23 1958)             Is this patient to be included in the SEP-1 Core Measure due to severe sepsis or septic shock? No   Exclusion criteria - the patient is NOT to be included for SEP-1 Core Measure due to: Infection is not suspected    Chronic Conditions affecting care:  has a past medical history of Dermatitis.     CONSULTS: (Who and What was discussed)  None      Social Determinants Significantly Affecting Health : None    Records Reviewed (External and Source) previous emergency room visits     CC/HPI Summary, DDx, ED Course, and Reassessment: Briefly, this is a 74-year-old male who presents emergency department today for evaluation for right lower quadrant abdominal pain. The patient states he did have sudden onset today. Associated nausea vomiting. On examination the patient is writhing around the bed and appears to be uncomfortable although is in no acute distress, he does have tenderness noted over the right flank into the right lower abdomen. Vital signs are stable. Disposition Considerations (tests considered but not done, Admit vs D/C, Shared Decision Making, Pt Expectation of Test or Tx.):    CBC shows no evidence of leukocytosis or anemia. CMP is unremarkable. Lipase is normal.  Lactic acid is initially 5.7 however this is due to the patient likely hyperventilating and writhing around the bed. I do not suspect sepsis or source of infection this was repeated and is trending downward and is essentially normalized at 2.1. Urine shows no evidence of infection but he does show blood. CT shows a 2 mm obstructing nephrolithiasis within the right distal ureter causing mild hydroureteronephrosis. Patient was given fluids as well as Dilaudid in the ED, continued to have pain and was given additional fluids as well as Toradol. He was also given Phenergan and Zofran. Upon reevaluation he is resting in bed comfortably and states that he is feeling much better. Patient and wife at bedside I did discuss all results with them. As pain is under control I do feel that he is able to be managed as an outpatient. He will be given Flomax and Percocet Zofran for home. He was referred to urology for follow-up within 2 to 3 days. Strict return precautions given, patient and family voiced understanding and are agreeable with plan, stable for discharge.    Results for orders placed or performed during the hospital encounter of 03/13/23   CBC with Auto Differential   Result Value Ref Range WBC 6.5 4.0 - 11.0 K/uL    RBC 5.22 4.20 - 5.90 M/uL    Hemoglobin 14.9 13.5 - 17.5 g/dL    Hematocrit 45.8 40.5 - 52.5 %    MCV 87.9 80.0 - 100.0 fL    MCH 28.5 26.0 - 34.0 pg    MCHC 32.4 31.0 - 36.0 g/dL    RDW 13.7 12.4 - 15.4 %    Platelets 998 640 - 973 K/uL    MPV 7.6 5.0 - 10.5 fL    Neutrophils % 60.2 %    Lymphocytes % 30.0 %    Monocytes % 4.5 %    Eosinophils % 4.7 %    Basophils % 0.6 %    Neutrophils Absolute 3.9 1.7 - 7.7 K/uL    Lymphocytes Absolute 1.9 1.0 - 5.1 K/uL    Monocytes Absolute 0.3 0.0 - 1.3 K/uL    Eosinophils Absolute 0.3 0.0 - 0.6 K/uL    Basophils Absolute 0.0 0.0 - 0.2 K/uL   Comprehensive Metabolic Panel w/ Reflex to MG   Result Value Ref Range    Sodium 141 136 - 145 mmol/L    Potassium reflex Magnesium 3.8 3.5 - 5.1 mmol/L    Chloride 100 99 - 110 mmol/L    CO2 26 21 - 32 mmol/L    Anion Gap 15 3 - 16    Glucose 149 (H) 70 - 99 mg/dL    BUN 18 7 - 20 mg/dL    Creatinine 1.1 0.9 - 1.3 mg/dL    Est, Glom Filt Rate >60 >60    Calcium 9.7 8.3 - 10.6 mg/dL    Total Protein 7.3 6.4 - 8.2 g/dL    Albumin 4.6 3.4 - 5.0 g/dL    Albumin/Globulin Ratio 1.7 1.1 - 2.2    Total Bilirubin 0.4 0.0 - 1.0 mg/dL    Alkaline Phosphatase 96 40 - 129 U/L    ALT 7 (L) 10 - 40 U/L    AST 19 15 - 37 U/L   Lipase   Result Value Ref Range    Lipase 42.0 13.0 - 60.0 U/L   Lactate, Sepsis   Result Value Ref Range    Lactic Acid, Sepsis 5.7 (HH) 0.4 - 1.9 mmol/L   Lactate, Sepsis   Result Value Ref Range    Lactic Acid, Sepsis 2.1 (H) 0.4 - 1.9 mmol/L   Ethanol   Result Value Ref Range    Ethanol Lvl None Detected mg/dL   Urine Drug Screen   Result Value Ref Range    Amphetamine Screen, Urine Neg Negative <1000ng/mL    Barbiturate Screen, Ur Neg Negative <200 ng/mL    Benzodiazepine Screen, Urine Neg Negative <200 ng/mL    Cannabinoid Scrn, Ur Neg Negative <50 ng/mL    Cocaine Metabolite Screen, Urine Neg Negative <300 ng/mL    Opiate Scrn, Ur Neg Negative <300 ng/mL    PCP Screen, Urine Neg Negative <25 ng/mL    Methadone Screen, Urine Neg Negative <300 ng/mL    Oxycodone Urine Neg Negative <100 ng/ml    FENTANYL SCREEN, URINE Neg Negative <5 ng/mL    pH, UA 7.0     Drug Screen Comment: see below    Urinalysis with Reflex to Culture    Specimen: Urine, clean catch   Result Value Ref Range    Color, UA Yellow Straw/Yellow    Clarity, UA Clear Clear    Glucose, Ur Negative Negative mg/dL    Bilirubin Urine Negative Negative    Ketones, Urine 40 (A) Negative mg/dL    Specific Gravity, UA 1.025 1.005 - 1.030    Blood, Urine SMALL (A) Negative    pH, UA 7.0 5.0 - 8.0    Protein, UA 30 (A) Negative mg/dL    Urobilinogen, Urine 1.0 <2.0 E.U./dL    Nitrite, Urine Negative Negative    Leukocyte Esterase, Urine Negative Negative    Microscopic Examination YES     Urine Type NotGiven     Urine Reflex to Culture Not Indicated    Microscopic Urinalysis   Result Value Ref Range    Bacteria, UA None Seen None Seen /HPF    Hyaline Casts, UA 0 0 - 8 /LPF    WBC, UA 0 0 - 5 /HPF    RBC, UA 25 (H) 0 - 4 /HPF    Epithelial Cells, UA 0 0 - 5 /HPF         I estimate there is LOW risk acute appendicitis, acute cholecystitis, cholangitis, pancreatitis, diverticulitis, perforated viscus, perforated ulcer, colitis, C. diff colitis, ischemic colitis, bowel obstruction, acute dissection, thus I consider the discharge disposition reasonable. Also, there is no evidence or peritonitis, sepsis, or toxicity. Virgen Ambrocio and I have discussed the diagnosis and risks, and we agree with discharging home to follow-up with their primary doctor. We also discussed returning to the Emergency Department immediately if new or worsening symptoms occur. We have discussed the symptoms which are most concerning (e.g., bloody stool, fever, changing or worsening pain, vomiting) that necessitate immediate return. I am the Primary Clinician of Record. FINAL IMPRESSION      1. Kidney stone    2.  Right flank pain          DISPOSITION/PLAN     DISPOSITION Discharge - Pending Orders Complete 03/13/2023 09:39:43 PM      PATIENT REFERRED TO:  Tamica Santana MD  [facility]  [phone]    Schedule an appointment as soon as possible for a visit in 2 days      [facility] Emergency Department  [address] [facility]  [phone]    As needed, If symptoms worsen      DISCHARGE MEDICATIONS:  New Prescriptions    ONDANSETRON (ZOFRAN) 4 MG TABLET    Take 1 tablet by mouth every 8 hours as needed for Nausea    OXYCODONE-ACETAMINOPHEN (PERCOCET) 5-325 MG PER TABLET    Take 1 tablet by mouth every 6 hours as needed for Pain for up to 3 days. Intended supply: 3 days.  Take lowest dose possible to manage pain Max Daily Amount: 4 tablets    TAMSULOSIN (FLOMAX) 0.4 MG CAPSULE    Take 1 capsule by mouth daily for 5 doses       DISCONTINUED MEDICATIONS:  Discontinued Medications    No medications on file              (Please note that portions of this note were completed with a voice recognition program.  Efforts were made to edit the dictations but occasionally words are mis-transcribed.)    Bee Wilson PA-C (electronically signed)        Bee Wilson PA-C  03/13/23 0966